# Patient Record
(demographics unavailable — no encounter records)

---

## 2025-01-22 NOTE — HEALTH RISK ASSESSMENT
[Good] : ~his/her~  mood as  good [No] : In the past 12 months have you used drugs other than those required for medical reasons? No [0] : 2) Feeling down, depressed, or hopeless: Not at all (0) [PHQ-2 Negative - No further assessment needed] : PHQ-2 Negative - No further assessment needed [Never] : Never [NO] : No [Yes] : Yes [FreeTextEntry1] : health maintenance [de-identified] : occasional [[id number]] : 0 [BoneDensityComments] : n/a [ColonoscopyComments] : n/a

## 2025-01-22 NOTE — PHYSICAL EXAM
[Normal] : affect was normal and insight and judgment were intact [de-identified] : Cyst mid back scar right lower back no fluctuance erythema tenderness

## 2025-01-22 NOTE — ADDENDUM
[FreeTextEntry1] : I, Cory Aguilera, acted as a scribe on behalf of Dr. Jay Sethi MD, on 01/22/2025.   All medical entries made by the scribe were at my, Dr. Jay Sethi MD, direction and personally dictated by me on 01/22/2025. I have reviewed the chart and agree that the record accurately reflects my personal performance of the history, physical exam, assessment and plan. I have also personally directed, reviewed, and agreed with the chart.

## 2025-01-22 NOTE — ASSESSMENT
[FreeTextEntry1] :  Annual Physical Exam: Fatigue, Sebaceous cyst - EKG stable - BP is stable. Continue current management. - Check A1c, lipid panels, vitamin levels, urine analysis, TSH, Testosterone Free MS/EQ Dialysis, PSA Total, Ferritin, Magnesium  -Appears to be no fluid accumulation of cyst will continue to monitor.   - RTO annually or as needed.   Pt verbalized understanding and will reach should any questions/concerns occur.

## 2025-01-22 NOTE — HISTORY OF PRESENT ILLNESS
[FreeTextEntry1] : Patient is present today to establish care and for a comprehensive Annual Physical Exam. [de-identified] : Patient is a 40yr old male who is present today to establish care and for a comprehensive Annual Physical Exam.  Patient is doing well overall.  Patient reports sebaceous cyst (~ 7 inches) on back which was infected and had since been drained about one month ago. Pt inquiries about follow up steps after drainage. Denies any fevers or chills accompanied with cyst, only severe pain. Pt also c/o fatigue/low energy during the day which has been intermittent for a few years now. Denies any weight gain. Confirms sleeping well during the night, denies snoring or pausing breathing. Confirms waking up feeling refreshed. Pt reports that his current diet is healthier than before but can be better.  Confirms physical strength is normal. Denies anxiety and depression. Pt is unaware if issue occurs before or after meals, or if attributed to eating big portions. Denies any PHx of issues. Confirms FHx of heart disease, mother and father, denies parents being diabetic. Denies parents smoking.  Confirms drinking occasionally himself, denies smoking. Confirms following with dentist routinely. Denies sitting for extended periods of time at work.   Declines flu vaccine on this visit.  Denies any CP, chest tightness or SOB. Denies any abdominal pain, urinary symptom, or change in bowel habits. Denies any fever, chills, or night sweats.

## 2025-04-23 NOTE — PHYSICAL EXAM
[No Acute Distress] : no acute distress [Well Nourished] : well nourished [Well Developed] : well developed [Normal Outer Ear/Nose] : the outer ears and nose were normal in appearance [No Lymphadenopathy] : no lymphadenopathy [No Respiratory Distress] : no respiratory distress  [No Accessory Muscle Use] : no accessory muscle use [Clear to Auscultation] : lungs were clear to auscultation bilaterally [Normal Rate] : normal rate  [Regular Rhythm] : with a regular rhythm [Normal S1, S2] : normal S1 and S2 [Normal Posterior Cervical Nodes] : no posterior cervical lymphadenopathy [Normal Anterior Cervical Nodes] : no anterior cervical lymphadenopathy [Normal Affect] : the affect was normal [Normal Insight/Judgement] : insight and judgment were intact [de-identified] : small shallow white ulcers underneath upper lip, no tonsillar erythema, swelling or exudate seen.

## 2025-04-23 NOTE — PHYSICAL EXAM
[No Acute Distress] : no acute distress [Well Nourished] : well nourished [Well Developed] : well developed [Normal Outer Ear/Nose] : the outer ears and nose were normal in appearance [No Lymphadenopathy] : no lymphadenopathy [No Respiratory Distress] : no respiratory distress  [No Accessory Muscle Use] : no accessory muscle use [Clear to Auscultation] : lungs were clear to auscultation bilaterally [Normal Rate] : normal rate  [Regular Rhythm] : with a regular rhythm [Normal S1, S2] : normal S1 and S2 [Normal Posterior Cervical Nodes] : no posterior cervical lymphadenopathy [Normal Anterior Cervical Nodes] : no anterior cervical lymphadenopathy [Normal Affect] : the affect was normal [Normal Insight/Judgement] : insight and judgment were intact [de-identified] : small shallow white ulcers underneath upper lip, no tonsillar erythema, swelling or exudate seen.

## 2025-04-23 NOTE — REVIEW OF SYSTEMS
[Postnasal Drip] : postnasal drip [Sore Throat] : sore throat [Headache] : headache [Negative] : Heme/Lymph [FreeTextEntry4] : white patches underneath upper lip, facial pressure

## 2025-04-23 NOTE — HISTORY OF PRESENT ILLNESS
[FreeTextEntry8] : 40-year-old male here for acute visit with c/o sore throat. Reports sore throat, dry mouth a/w difficulty swallowing, and postnasal drip for 5-6 days had productive cough and SOB which has gotten better. Denies nasal congestion or voice hoarseness. He is able to eat food & drink water with no limitation. Has facial pressure and slight headache. Denies ear pain or dizziness.   Also reports white spots underneath upper lip, a/w burning sensation. Notes he eats alot of spicy foods.  Denies fever, chills, night sweats, or fatigue. Denies chest pain, palpitation, or wheezing.  Has tried otc cough syrup and Advil with no relief.  Denies N/V, abdominal pain, change in bowel habits, States daughter at home was previously sick as well. 'Did not test for covid or flu.

## 2025-04-23 NOTE — ASSESSMENT
[FreeTextEntry1] : BP stable, pt afebrile. Rapid strep test, negative. Throat culture ordered.  Adequate rest, hydration advised. Continue supportive care.  rinsing the mouth with warm salt water, and ibuprofen 400mg every 8 hours as needed. Advised to limit spicy, acidic foods.  Lidocaine mouth wash and Augmentin sent.    Patient advised to f/u for persisting or worsening s/s.